# Patient Record
Sex: MALE | Race: WHITE | NOT HISPANIC OR LATINO | Employment: OTHER | URBAN - NONMETROPOLITAN AREA
[De-identification: names, ages, dates, MRNs, and addresses within clinical notes are randomized per-mention and may not be internally consistent; named-entity substitution may affect disease eponyms.]

---

## 2019-11-22 ENCOUNTER — OFFICE VISIT CONVERTED (OUTPATIENT)
Dept: FAMILY MEDICINE CLINIC | Age: 24
End: 2019-11-22
Attending: FAMILY MEDICINE

## 2021-05-18 NOTE — PROGRESS NOTES
Trent Kellogg  1995     Office/Outpatient Visit    Visit Date: Fri, Nov 22, 2019 09:50 am    Provider: Tomas Quinn MD (Assistant: Marie Be MA)    Location: [facility]        Electronically signed by Tomas Quinn MD on  12/29/2019 04:04:10 PM                             Subjective:        CC: Art is a 24 year old White male.  This is his first visit to the clinic.  establish care FATHER STATES PT ISNT TAKING SINGULAIR ANYMORE         HPI: 123/85 with a HR of 100.           PHQ-9 Depression Screening: Completed form scanned and in chart; Total Score 0       Pt has autism. He is accompanied by his father. Father is seeking guardianship. He has lived with his father since the age of 14. Prior to then he lived with both parents from birth until the age of 12, then with his mother from 12 to 14, and then on with his father. From the age of 20 until now he has not had a guardian but has been cared for by his father. His father reports he can write, probably can read, and evaluations at school indicated he has approximately a 6 year old mentality. He is mostly non-verbal but can repeat a couple words and can ask for help or ask for a few things like chips. He cannot carry on a conversation. His father bathes him. He can feed himself and dress himself but his father does all the cooking and cleaning.       As above, pt has severe intellectual disability. According to patient's father, testing showed patient has the intellectual level of a 6 year old.       Pt has autism and severe intellectual disability. He has a strict routine and can become aggressive if this routine is altered. He is on risperdal 1 mg/ml, 1 ml BID for this.     ROS:     CONSTITUTIONAL:  Positive for ROS is limited by pt's autism, intellectual disability, and paucity of language.   Negative for fatigue or fever.      EYES:  Negative for blurred vision.      E/N/T:  Negative for diminished hearing and nasal  congestion.      CARDIOVASCULAR:  Negative for chest pain and palpitations.      RESPIRATORY:  Negative for recent cough and dyspnea.      GASTROINTESTINAL:  Negative for abdominal pain, constipation, diarrhea, nausea and vomiting.      GENITOURINARY:  Negative for dysuria.      MUSCULOSKELETAL:  Negative for arthralgias and myalgias.      INTEGUMENTARY:  Negative for atypical mole(s) and rash.      NEUROLOGICAL:  Positive for intellectual disability, autism.   Negative for paresthesias or weakness.      PSYCHIATRIC:  Positive for pt has andreia rigid routine and can become aggressive if this is altered.   Negative for anxiety, depression or sleep disturbance.          Past Medical History / Family History / Social History:         Last Reviewed on 12/29/2019 04:02 PM by Tomas Quinn    Past Medical History:             PAST MEDICAL HISTORY         Autistic     Autism    Mental Retardation     aggressive behavior         Surgical History:         had to have teeth cleaned under anesthesia 7-13;         Family History:     Father: Hypertension     Mother: hearing impaired         Social History:         Household:  Lives with his father.  barbara lopez carlie     No exposure to tobacco smoke.      goes to [employer]     Occupation:    Disabled     Marital Status: Single     Children: None     Art denies any current form of exercise.          Tobacco/Alcohol/Supplements:     Last Reviewed on 12/29/2019 04:02 PM by Tomas Quinn    Tobacco: He has never smoked.          Substance Abuse History:     Last Reviewed on 12/29/2019 04:02 PM by Tomas Quinn    None         Mental Health History:     Last Reviewed on 12/29/2019 04:02 PM by Tomas Quinn        Autism and intellectual disabilty         Communicable Diseases (eg STDs):     Last Reviewed on 12/29/2019 04:02 PM by Tomas Quinn        Current Problems:     Last Reviewed on 12/29/2019 04:02 PM by Tomas Quinn    Severe intellectual  disabilities    Autistic disorder    Hostility    Encounter for screening for depression        Immunizations:     DTaP 1997    DTaP 2/19/1998    DTaP 4/14/1998    DTaP 6/28/1999    DTaP 11/2/2001    PedvaxHIB (Hib PRP-OMP) 2/19/1998    PedvaxHIB (Hib PRP-OMP) 11/4/1999    ActHIB (Hib PRP-T) 1997    Comvax (Hib-HepB) 1/28/1999    Hep B (pedi/adol, 3-dose schedule) 1997    Hep B (pedi/adol, 3-dose schedule) 1997    Hep B (pedi/adol, 3-dose schedule) 1/28/1999    IPV  Poliovirus, inactivated 1997    IPV  Poliovirus, inactivated 2/19/1998    IPV  Poliovirus, inactivated 4/14/1998    IPV  Poliovirus, inactivated 1/2/2001    MMR  (Measles-Mumps-Rubella), live 1/20/1999    MMR  (Measles-Mumps-Rubella), live 11/2/2001    Varicella, live 1/28/1999        Allergies:     Last Reviewed on 12/29/2019 04:02 PM by Tomas Quinn    No Known Allergies.        Current Medications:     Last Reviewed on 12/29/2019 04:02 PM by Tomas Quinn    Singulair  5mg Chewable Tablet [Chew 1 tablet(s) by mouth each evening]    MVI  qd    Risperdal 1mg/1ml Oral Solution [1 cc bid for behavior]        Objective:        Vitals:         Current: 11/22/2019 9:58:56 AM    Ht:  5 ft, 9.25 in;  Wt: 268 lbs;  BMI: 39.3T: 97.5 F (oral);  BP: 123/85 mm Hg (left arm, sitting);  P: 100 bpm (left arm (BP Cuff), sitting);  sCr: 0.5 mg/dL;  GFR: 261.38        Exams:     PHYSICAL EXAM:     GENERAL: Vitals recorded well developed, well nourished,  moderately obese;  well groomed;  no apparent distress;     EYES: extraocular movements intact; conjunctiva and cornea are normal; PERRLA;     E/N/T: OROPHARYNX:  normal mucosa, dentition, gingiva, and posterior pharynx;     NECK: range of motion is normal; thyroid exam reveals not enlarged;     RESPIRATORY: normal respiratory rate and pattern with no distress; normal breath sounds with no rales, rhonchi, wheezes or rubs;     CARDIOVASCULAR: normal rate; rhythm is regular;  no  systolic murmur; no edema;     GASTROINTESTINAL: nontender; normal bowel sounds;     LYMPHATIC: no enlargement of cervical or facial nodes;     SKIN: lesions on forearms bilaterally;     MUSCULOSKELETAL: normal gait; normal overall tone     NEUROLOGIC: mental status: alert and oriented x 3; reflexes: knee jerks: 2+;     PSYCHIATRIC: affect/demeanor: flat;  psychomotor: displays psychomotor retardation and avoids eye contact;  speech pattern: paucity of language;  thought/perception: thought/perceptin is difficult to ascertain as pt is predominantly non-verbal;         Assessment:         Z13.31   Encounter for screening for depression       F84.0   Autistic disorder       F72   Severe intellectual disabilities       R45.5   Hostility           ORDERS:         Other Orders:         Depression screen negative  (In-House)                      Plan:         Encounter for screening for depression    MIPS PHQ-9 Depression Screening: Completed form scanned and in chart; Total Score 0; Negative Depression Screen           Orders:         Depression screen negative  (In-House)              Autistic disorderPaperwork is completed to help pt's father gain guardianship. Father appears to have a very good and positive relationship with patient and is his sole caregiver. Guardianship and conservatorship is necessary based on patient condition of severe autism and intellectual disability.         Severe intellectual disabilitiesAs above.         HostilityCont risperdal 1 mg/ml, 1 ml BID for agitation related to autism             Charge Capture:         Primary Diagnosis:     Z13.31  Encounter for screening for depression           Orders:      [id number]  Office visit - new pt, level 3  (In-House)              Depression screen negative  (In-House)              F84.0  Autistic disorder     F72  Severe intellectual disabilities     R45.5  Hostility

## 2021-07-01 VITALS
BODY MASS INDEX: 39.69 KG/M2 | WEIGHT: 268 LBS | HEIGHT: 69 IN | TEMPERATURE: 97.5 F | HEART RATE: 100 BPM | DIASTOLIC BLOOD PRESSURE: 85 MMHG | SYSTOLIC BLOOD PRESSURE: 123 MMHG

## 2022-09-27 ENCOUNTER — OFFICE VISIT (OUTPATIENT)
Dept: FAMILY MEDICINE CLINIC | Age: 27
End: 2022-09-27

## 2022-09-27 VITALS
SYSTOLIC BLOOD PRESSURE: 117 MMHG | HEART RATE: 106 BPM | BODY MASS INDEX: 35.28 KG/M2 | OXYGEN SATURATION: 97 % | HEIGHT: 71 IN | WEIGHT: 252 LBS | DIASTOLIC BLOOD PRESSURE: 83 MMHG

## 2022-09-27 DIAGNOSIS — F84.0 AUTISM: Primary | ICD-10-CM

## 2022-09-27 DIAGNOSIS — F79 INTELLECTUAL DISABILITY: ICD-10-CM

## 2022-09-27 DIAGNOSIS — E66.9 CLASS 2 OBESITY WITHOUT SERIOUS COMORBIDITY WITH BODY MASS INDEX (BMI) OF 35.0 TO 35.9 IN ADULT, UNSPECIFIED OBESITY TYPE: ICD-10-CM

## 2022-09-27 PROCEDURE — 99203 OFFICE O/P NEW LOW 30 MIN: CPT | Performed by: NURSE PRACTITIONER

## 2022-09-27 RX ORDER — RISPERIDONE 1 MG/ML
SOLUTION ORAL
Qty: 60 ML | Refills: 5 | Status: SHIPPED | OUTPATIENT
Start: 2022-09-27 | End: 2023-03-28 | Stop reason: SDUPTHER

## 2022-09-27 RX ORDER — RISPERIDONE 1 MG/ML
SOLUTION ORAL
COMMUNITY
Start: 2022-09-06 | End: 2022-09-27 | Stop reason: SDUPTHER

## 2022-09-27 RX ORDER — METFORMIN HYDROCHLORIDE 500 MG/5ML
SOLUTION ORAL
COMMUNITY
Start: 2022-09-24 | End: 2023-01-12 | Stop reason: SDUPTHER

## 2022-09-27 RX ORDER — PEDIATRIC MULTIVITAMIN NO.17
1 TABLET,CHEWABLE ORAL DAILY
COMMUNITY

## 2022-09-27 NOTE — ASSESSMENT & PLAN NOTE
Can only take liquid medication.  Does not need metformin refills at this time.  Will refill metformin when needed.  Dad will continue collaborative discussion and determination of when and if healthcare labs could be done or indicated.  He is advised to monitor for any signs or symptoms of diabetes.

## 2022-09-27 NOTE — ASSESSMENT & PLAN NOTE
Stable on current treatment long-term.  Dad is aware of risks with long-term Risperdal usage.  He feels as if the benefits far outweigh the potential risks or side effects.  I do not routinely prescribe risperidone.  If patient is not stable on current risperidone dosage at any time, I will then recommend referral to a specialist for further evaluation.  Father verbalizes understanding.  The goal right now is to maintain him at his current status.

## 2023-01-12 DIAGNOSIS — E66.9 CLASS 2 OBESITY WITHOUT SERIOUS COMORBIDITY WITH BODY MASS INDEX (BMI) OF 35.0 TO 35.9 IN ADULT, UNSPECIFIED OBESITY TYPE: Primary | ICD-10-CM

## 2023-01-12 RX ORDER — METFORMIN HYDROCHLORIDE 500 MG/5ML
SOLUTION ORAL
Qty: 600 ML | Refills: 3 | Status: SHIPPED | OUTPATIENT
Start: 2023-01-12 | End: 2023-03-28 | Stop reason: SDUPTHER

## 2023-01-16 ENCOUNTER — PRIOR AUTHORIZATION (OUTPATIENT)
Dept: FAMILY MEDICINE CLINIC | Age: 28
End: 2023-01-16
Payer: MEDICAID

## 2023-03-26 DIAGNOSIS — F84.0 AUTISM: ICD-10-CM

## 2023-03-26 DIAGNOSIS — F79 INTELLECTUAL DISABILITY: ICD-10-CM

## 2023-03-28 ENCOUNTER — OFFICE VISIT (OUTPATIENT)
Dept: FAMILY MEDICINE CLINIC | Age: 28
End: 2023-03-28
Payer: MEDICAID

## 2023-03-28 VITALS
HEIGHT: 71 IN | SYSTOLIC BLOOD PRESSURE: 119 MMHG | DIASTOLIC BLOOD PRESSURE: 77 MMHG | BODY MASS INDEX: 33.74 KG/M2 | OXYGEN SATURATION: 93 % | WEIGHT: 241 LBS | HEART RATE: 84 BPM

## 2023-03-28 DIAGNOSIS — E66.9 CLASS 1 OBESITY WITHOUT SERIOUS COMORBIDITY WITH BODY MASS INDEX (BMI) OF 34.0 TO 34.9 IN ADULT, UNSPECIFIED OBESITY TYPE: ICD-10-CM

## 2023-03-28 DIAGNOSIS — F79 INTELLECTUAL DISABILITY: Primary | ICD-10-CM

## 2023-03-28 DIAGNOSIS — F84.0 AUTISM: ICD-10-CM

## 2023-03-28 PROCEDURE — 99213 OFFICE O/P EST LOW 20 MIN: CPT | Performed by: NURSE PRACTITIONER

## 2023-03-28 PROCEDURE — 1159F MED LIST DOCD IN RCRD: CPT | Performed by: NURSE PRACTITIONER

## 2023-03-28 PROCEDURE — 1160F RVW MEDS BY RX/DR IN RCRD: CPT | Performed by: NURSE PRACTITIONER

## 2023-03-28 RX ORDER — RISPERIDONE 1 MG/ML
SOLUTION ORAL
Qty: 180 ML | Refills: 1 | Status: SHIPPED | OUTPATIENT
Start: 2023-03-28

## 2023-03-28 RX ORDER — RISPERIDONE 1 MG/ML
SOLUTION ORAL
Qty: 60 ML | Refills: 5 | Status: SHIPPED | OUTPATIENT
Start: 2023-03-28 | End: 2023-03-28

## 2023-03-28 RX ORDER — METFORMIN HYDROCHLORIDE 500 MG/5ML
SOLUTION ORAL
Qty: 600 ML | Refills: 3 | Status: SHIPPED | OUTPATIENT
Start: 2023-03-28 | End: 2023-03-28

## 2023-03-28 RX ORDER — RISPERIDONE 1 MG/ML
SOLUTION ORAL
Qty: 60 ML | Refills: 5 | OUTPATIENT
Start: 2023-03-28

## 2023-03-28 RX ORDER — METFORMIN HYDROCHLORIDE 500 MG/5ML
SOLUTION ORAL
Qty: 600 ML | Refills: 3 | Status: SHIPPED | OUTPATIENT
Start: 2023-03-28

## 2023-03-28 NOTE — ASSESSMENT & PLAN NOTE
Consider possible lab collection with sedation in the future.  Refills provided.  We will continue current treatment.  Will refer for further evaluation if not stable on current treatment.  Sleep is normal for patient.

## 2023-03-28 NOTE — PROGRESS NOTES
"Chief Complaint  Autism (6 month follow up ), Intellectual disability, and Obesity    Subjective          Trent Kellogg presents to [facility]     Patient is a 27-year-old male who is here today with his father to follow-up regarding obesity and autism.  He has been taking metformin solution from his former pediatrician for prevention of diabetes due to his obesity.  Patient is a picky eater.  He has experienced some intentional weight loss with eating out less often.  Appetite is normal for patient.  Has not received any vaccinations since he was 7 years old and has to have sedation when getting his teeth cleaned twice a year at [facility].  Prefers not to have labs done.    Continues to do well on risperdal.  Previous history of biting.  Does not feel comfortable with taking patient to [facility] due to risk with his behavior.  Overall is done very well on current treatment and would like to continue current treatment.  Denies medication side effects.  Is aware of risk versus benefits with continued use of risperdal.     Objective   Vital Signs:   Vitals:    03/28/23 1503   BP: 119/77   BP Location: Left arm   Patient Position: Sitting   Cuff Size: Large Adult   Pulse: 84   SpO2: 93%   Weight: 109 kg (241 lb)   Height: 179.1 cm (70.5\")      Body mass index is 34.09 kg/m².  Physical Exam  Vitals reviewed.   Constitutional:       General: He is not in acute distress.     Appearance: Normal appearance. He is well-developed. He is obese.   Neck:      Thyroid: No thyromegaly.   Cardiovascular:      Rate and Rhythm: Normal rate and regular rhythm.      Heart sounds: Normal heart sounds.   Pulmonary:      Effort: Pulmonary effort is normal.      Breath sounds: Normal breath sounds.   Musculoskeletal:      Right lower leg: No edema.      Left lower leg: No edema.   Skin:     General: Skin is warm and dry.   Neurological:      General: No focal deficit present.      " Mental Status: He is alert.   Psychiatric:         Attention and Perception: Attention normal.         Mood and Affect: Mood and affect normal.         Behavior: Behavior normal.           Current Outpatient Medications:   •  metFORMIN HCl 500 MG/5ML solution, Take 2 tsp by mouth twice daily, Disp: 600 mL, Rfl: 3  •  Pediatric Multiple Vitamins (Multivitamin Childrens) chewable tablet, Chew 1 each Daily., Disp: , Rfl:   •  risperiDONE (risperDAL) 1 MG/ML oral solution, 1 ml twice daily, Disp: 180 mL, Rfl: 1  •  TRIAZOLAM PO, TAKE 2.5ML BY MOUTH AT BEDTIME THE NIGHT BEFORE DENTAL APPOINTMENT AND THEN 2.5ML BY MOUTH ONE HOUR PRIOR TO DENTAL APPOINTMENT, Disp: , Rfl:    History reviewed. No pertinent past medical history.      Result Review :                                    Assessment and Plan    Diagnoses and all orders for this visit:    1. Intellectual disability (Primary)  Assessment & Plan:  Consider possible lab collection with sedation in the future.  Refills provided.  We will continue current treatment.  Will refer for further evaluation if not stable on current treatment.  Sleep is normal for patient.    Orders:  -     Discontinue: risperiDONE (risperDAL) 1 MG/ML oral solution; 1 ml twice daily  Dispense: 60 mL; Refill: 5  -     risperiDONE (risperDAL) 1 MG/ML oral solution; 1 ml twice daily  Dispense: 180 mL; Refill: 1    2. Autism  -     Discontinue: risperiDONE (risperDAL) 1 MG/ML oral solution; 1 ml twice daily  Dispense: 60 mL; Refill: 5  -     risperiDONE (risperDAL) 1 MG/ML oral solution; 1 ml twice daily  Dispense: 180 mL; Refill: 1    3. Class 1 obesity without serious comorbidity with body mass index (BMI) of 34.0 to 34.9 in adult, unspecified obesity type  -     metFORMIN HCl 500 MG/5ML solution; Take 2 tsp by mouth twice daily  Dispense: 600 mL; Refill: 3    Other orders  -     Discontinue: metFORMIN HCl 500 MG/5ML solution; Take 2 tsp by mouth twice daily  Dispense: 600 mL; Refill:  3      Follow Up    No follow-ups on file.  Patient was given instructions and counseling regarding his condition or for health maintenance advice. Please see specific information pulled into the AVS if appropriate.

## 2023-07-26 DIAGNOSIS — F79 INTELLECTUAL DISABILITY: ICD-10-CM

## 2023-07-26 DIAGNOSIS — F84.0 AUTISM: ICD-10-CM

## 2023-07-26 RX ORDER — RISPERIDONE 1 MG/ML
SOLUTION ORAL
Qty: 180 ML | Refills: 1 | OUTPATIENT
Start: 2023-07-26

## 2023-09-04 DIAGNOSIS — E66.9 CLASS 1 OBESITY WITHOUT SERIOUS COMORBIDITY WITH BODY MASS INDEX (BMI) OF 34.0 TO 34.9 IN ADULT, UNSPECIFIED OBESITY TYPE: ICD-10-CM

## 2023-09-05 RX ORDER — METFORMIN HYDROCHLORIDE 500 MG/5ML
SOLUTION ORAL
Qty: 600 ML | Refills: 3 | OUTPATIENT
Start: 2023-09-05

## 2023-09-28 ENCOUNTER — OFFICE VISIT (OUTPATIENT)
Dept: FAMILY MEDICINE CLINIC | Age: 28
End: 2023-09-28
Payer: MEDICAID

## 2023-09-28 VITALS
OXYGEN SATURATION: 98 % | SYSTOLIC BLOOD PRESSURE: 124 MMHG | HEIGHT: 71 IN | HEART RATE: 103 BPM | BODY MASS INDEX: 31.33 KG/M2 | WEIGHT: 223.8 LBS | DIASTOLIC BLOOD PRESSURE: 76 MMHG

## 2023-09-28 DIAGNOSIS — F84.0 AUTISM: Primary | ICD-10-CM

## 2023-09-28 DIAGNOSIS — F79 INTELLECTUAL DISABILITY: ICD-10-CM

## 2023-09-28 DIAGNOSIS — E66.9 CLASS 1 OBESITY WITH SERIOUS COMORBIDITY AND BODY MASS INDEX (BMI) OF 31.0 TO 31.9 IN ADULT, UNSPECIFIED OBESITY TYPE: ICD-10-CM

## 2023-09-28 PROCEDURE — 1159F MED LIST DOCD IN RCRD: CPT | Performed by: NURSE PRACTITIONER

## 2023-09-28 PROCEDURE — 1160F RVW MEDS BY RX/DR IN RCRD: CPT | Performed by: NURSE PRACTITIONER

## 2023-09-28 PROCEDURE — 99213 OFFICE O/P EST LOW 20 MIN: CPT | Performed by: NURSE PRACTITIONER

## 2023-09-28 RX ORDER — METFORMIN HYDROCHLORIDE 500 MG/5ML
SOLUTION ORAL
Qty: 600 ML | Refills: 3 | Status: SHIPPED | OUTPATIENT
Start: 2023-09-28

## 2023-09-28 RX ORDER — RISPERIDONE 1 MG/ML
SOLUTION ORAL
Qty: 180 ML | Refills: 1 | Status: SHIPPED | OUTPATIENT
Start: 2023-09-28

## 2023-09-28 NOTE — PROGRESS NOTES
Chief Complaint  Intellectual disability (6 month follow up ), Autistic Spectrum, and Obesity    Subjective          Trent Kellogg presents to [facility]      Patient is a 27-year-old male who is here today with his father to follow-up regarding obesity and autism.  He has been taking metformin solution from his former pediatrician for prevention of diabetes due to his obesity.  Patient is a picky eater.  He has experienced some intentional weight loss with eating out less often.  Appetite is normal for patient.  Has not received any vaccinations since he was 7 years old and has to have sedation when getting his teeth cleaned twice a year at [facility] [employer].  Prefers not to have labs done.     Continues to do well on risperdal.  Previous history of biting.  Does not feel comfortable with taking patient to [facility] due to risk with his behavior.  Overall is done very well on current treatment and would like to continue current treatment.  Denies medication side effects.  Is aware of risk versus benefits with continued use of risperdal.    Patient weight is down approximately 18 pounds since his last visit in March.  Father denies any change in his eating habits or activity level.  He may be eating less sweets.  Denies any increase in thirst or urination.  If patient is having unexpected weight loss it is imperative that we get labs done.  Patient require sedation to go to the dentist for cleanings.  We may need to consider sedating him to have labs done if he is having unexplained weight loss.  Father verbalizes understanding.  I encouraged him to monitor more closely and observe for symptoms of diabetes or other conditions that could contribute to unexplained weight loss.  He will let me know if any symptoms are present and we will try to come up with a plan to get labs collected for further evaluation    Objective   Vital Signs:   Vitals:    09/28/23 1505   BP:  "124/76   BP Location: Right arm   Patient Position: Sitting   Cuff Size: Adult   Pulse: 103   SpO2: 98%   Weight: 102 kg (223 lb 12.8 oz)   Height: 179.1 cm (70.5\")       Wt Readings from Last 3 Encounters:   09/28/23 102 kg (223 lb 12.8 oz)   03/28/23 109 kg (241 lb)   09/27/22 114 kg (252 lb)      BP Readings from Last 3 Encounters:   09/28/23 124/76   03/28/23 119/77   09/27/22 117/83       Body mass index is 31.66 kg/m².             Physical Exam  Vitals reviewed.   Constitutional:       General: He is not in acute distress.     Appearance: Normal appearance. He is well-developed. He is obese.   Neck:      Thyroid: No thyromegaly.   Cardiovascular:      Rate and Rhythm: Normal rate and regular rhythm.      Pulses:           Posterior tibial pulses are 2+ on the right side and 2+ on the left side.      Heart sounds: Normal heart sounds.   Pulmonary:      Effort: Pulmonary effort is normal.      Breath sounds: Normal breath sounds.   Musculoskeletal:      Right lower leg: No edema.      Left lower leg: No edema.   Skin:     General: Skin is warm and dry.   Neurological:      General: No focal deficit present.      Mental Status: He is alert.   Psychiatric:         Attention and Perception: Attention normal.         Mood and Affect: Mood and affect normal.         Behavior: Behavior normal.         Current Outpatient Medications:     CBD (cannabidiol) oral oil, Take  by mouth. 1 Gummie before bed, Disp: , Rfl:     metFORMIN HCl 500 MG/5ML solution, Take 2 tsp by mouth twice daily, Disp: 600 mL, Rfl: 3    Pediatric Multiple Vitamins (Multivitamin Childrens) chewable tablet, Chew 1 each Daily., Disp: , Rfl:     risperiDONE (risperDAL) 1 MG/ML oral solution, 1 ml twice daily, Disp: 180 mL, Rfl: 1    TRIAZOLAM PO, TAKE 2.5ML BY MOUTH AT BEDTIME THE NIGHT BEFORE DENTAL APPOINTMENT AND THEN 2.5ML BY MOUTH ONE HOUR PRIOR TO DENTAL APPOINTMENT (Patient not taking: Reported on 9/28/2023), Disp: , Rfl:    Past Medical " History:   Diagnosis Date    ADHD (attention deficit hyperactivity disorder)      No Known Allergies            Result Review :          No Images in the past 120 days found..           Social History     Tobacco Use   Smoking Status Never   Smokeless Tobacco Never           Assessment and Plan    Diagnoses and all orders for this visit:    1. Autism (Primary)  Assessment & Plan:  Father reports that status is stable.  Continue current treatment.    Orders:  -     risperiDONE (risperDAL) 1 MG/ML oral solution; 1 ml twice daily  Dispense: 180 mL; Refill: 1    2. Intellectual disability  -     risperiDONE (risperDAL) 1 MG/ML oral solution; 1 ml twice daily  Dispense: 180 mL; Refill: 1    3. Class 1 obesity with serious comorbidity and body mass index (BMI) of 31.0 to 31.9 in adult, unspecified obesity type  -     metFORMIN HCl 500 MG/5ML solution; Take 2 tsp by mouth twice daily  Dispense: 600 mL; Refill: 3        Follow Up    Return in about 6 months (around 3/28/2024).  Patient was given instructions and counseling regarding his condition or for health maintenance advice. Please see specific information pulled into the AVS if appropriate.

## 2024-01-09 ENCOUNTER — PRIOR AUTHORIZATION (OUTPATIENT)
Dept: FAMILY MEDICINE CLINIC | Age: 29
End: 2024-01-09
Payer: MEDICAID

## 2024-03-26 ENCOUNTER — TELEPHONE (OUTPATIENT)
Dept: FAMILY MEDICINE CLINIC | Age: 29
End: 2024-03-26
Payer: MEDICAID

## 2024-03-26 NOTE — TELEPHONE ENCOUNTER
"Caller: LESA MORTON    Relationship: Brother/Sister    Best call back number: [phone]    What form or medical record are you requesting: LETTER STATING DUE TO THE PATIENTS DISABILITY HE REQUIRES A CARETAKER AND IT MUST STATE THE CARETAKERS NAME \"ITZEL HOUX\" WHO IS THE PATIENTS .     Who is requesting this form or medical record from you: [facility] OF [facility] FAMILY    How would you like to receive the form or medical records (pick-up, mail, fax):      Timeframe paperwork needed: ASAP      "

## 2024-04-05 ENCOUNTER — OFFICE VISIT (OUTPATIENT)
Dept: FAMILY MEDICINE CLINIC | Age: 29
End: 2024-04-05
Payer: MEDICAID

## 2024-04-05 VITALS
TEMPERATURE: 98.5 F | HEART RATE: 107 BPM | HEIGHT: 71 IN | OXYGEN SATURATION: 98 % | BODY MASS INDEX: 24.81 KG/M2 | DIASTOLIC BLOOD PRESSURE: 77 MMHG | SYSTOLIC BLOOD PRESSURE: 108 MMHG | WEIGHT: 177.2 LBS

## 2024-04-05 DIAGNOSIS — H10.31 ACUTE CONJUNCTIVITIS OF RIGHT EYE, UNSPECIFIED ACUTE CONJUNCTIVITIS TYPE: Primary | ICD-10-CM

## 2024-04-05 PROCEDURE — 1159F MED LIST DOCD IN RCRD: CPT | Performed by: NURSE PRACTITIONER

## 2024-04-05 PROCEDURE — 99213 OFFICE O/P EST LOW 20 MIN: CPT | Performed by: NURSE PRACTITIONER

## 2024-04-05 PROCEDURE — 1160F RVW MEDS BY RX/DR IN RCRD: CPT | Performed by: NURSE PRACTITIONER

## 2024-04-05 RX ORDER — POLYMYXIN B SULFATE AND TRIMETHOPRIM 1; 10000 MG/ML; [USP'U]/ML
1 SOLUTION OPHTHALMIC EVERY 4 HOURS
Qty: 10 ML | Refills: 0 | Status: SHIPPED | OUTPATIENT
Start: 2024-04-05

## 2024-04-05 NOTE — PROGRESS NOTES
Chief Complaint  Trent Kellogg presents to [facility] for Eye Problem (Right eye itching, watering, redness, X yesterday )    Subjective          History of Present Illness    Art is here today with his caregiver. Reports right eye irritation, watery drainage, erythema since yesterday. He has been keeping his eyes closed. Unable to perform visual acuity due to hx intellectual disability.     Review of Systems      No Known Allergies   Past Medical History:   Diagnosis Date    ADHD (attention deficit hyperactivity disorder)      Current Outpatient Medications   Medication Sig Dispense Refill    metFORMIN HCl 500 MG/5ML solution Take 2 tsp by mouth twice daily 600 mL 3    Pediatric Multiple Vitamins (Multivitamin Childrens) chewable tablet Chew 1 each Daily.      risperiDONE (risperDAL) 1 MG/ML oral solution 1 ml twice daily 180 mL 1    TRIAZOLAM PO As Needed (before dental appts).      trimethoprim-polymyxin b (Polytrim) 88516-0.1 UNIT/ML-% ophthalmic solution Administer 1 drop to the right eye Every 4 (Four) Hours. 10 mL 0     No current facility-administered medications for this visit.     History reviewed. No pertinent surgical history.   Social History     Tobacco Use    Smoking status: Never     Passive exposure: Past    Smokeless tobacco: Never   Vaping Use    Vaping status: Never Used   Substance Use Topics    Alcohol use: Never    Drug use: Never     Family History   Problem Relation Age of Onset    Peripheral vascular disease Paternal Grandmother     Heart disease Paternal Grandfather     Alcohol abuse Father     Hyperlipidemia Father     Liver disease Father     Heart disease Paternal Uncle      Health Maintenance Due   Topic Date Due    TDAP/TD VACCINES (2 - Td or Tdap) 07/11/2022    ANNUAL PHYSICAL  Never done      Immunization History   Administered Date(s) Administered    DTaP, Unspecified 1997, 02/19/1998    Hep B, Adolescent or Pediatric 1997    HiB  "1997, 02/19/1998    IPV 1997, 02/19/1998    Meningococcal MCV4P (Menactra) 07/11/2012    Tdap 07/11/2012    Varicella 07/11/2012        Objective     Vitals:    04/05/24 0942   BP: 108/77   BP Location: Right arm   Patient Position: Sitting   Cuff Size: Adult   Pulse: 107   Temp: 98.5 °F (36.9 °C)   TempSrc: Infrared   SpO2: 98%   Weight: 80.4 kg (177 lb 3.2 oz)   Height: 179.1 cm (70.5\")     Body mass index is 25.07 kg/m².                No results found.    Physical Exam  Vitals reviewed.   Constitutional:       General: He is not in acute distress.     Appearance: Normal appearance. He is well-developed.   HENT:      Head: Normocephalic and atraumatic.   Eyes:      General: Lids are normal.         Right eye: Discharge (watery) present.         Left eye: No discharge.      Conjunctiva/sclera:      Right eye: Right conjunctiva is injected.      Left eye: Left conjunctiva is not injected.      Comments: Bilateral reactive to light   Neurological:      Mental Status: He is alert. Mental status is at baseline.   Psychiatric:         Mood and Affect: Affect normal.           Result Review :                               Assessment and Plan      Assessment & Plan  Acute conjunctivitis of right eye, unspecified acute conjunctivitis type  Will treat with polytrim drops. Advised to wash hands before and after administering drops. Also instructed on use of compresses. Use clean washcloth. May need to see optometry if persistent symptoms.      New Medications Ordered This Visit   Medications    trimethoprim-polymyxin b (Polytrim) 61380-3.1 UNIT/ML-% ophthalmic solution     Sig: Administer 1 drop to the right eye Every 4 (Four) Hours.     Dispense:  10 mL     Refill:  0                 Follow Up     Return for As needed for persistent or worsening symptoms, Chronic visit follow up, with PCP.             "

## 2024-04-09 ENCOUNTER — TELEPHONE (OUTPATIENT)
Dept: FAMILY MEDICINE CLINIC | Age: 29
End: 2024-04-09
Payer: MEDICAID

## 2024-04-09 NOTE — TELEPHONE ENCOUNTER
Pt was informed on no show policy as well as a letter was sent for first no show on 4/21/24 at 3:00.

## 2024-04-12 ENCOUNTER — TELEPHONE (OUTPATIENT)
Dept: FAMILY MEDICINE CLINIC | Age: 29
End: 2024-04-12
Payer: MEDICAID

## 2024-04-12 NOTE — TELEPHONE ENCOUNTER
Caller: LESA MORTON    Relationship: Brother/Sister    Best call back number: [phone]    What was the call regarding: LESA STATES SHE WOULD LIKE TO KNOW IF THE PATIENTS SURENDRA MCKEON WOULD BE READY TODAY (4/12) BECAUSE SHE REALLY HOPED TO HAVE IT BACK THIS WEEK.

## 2024-05-09 ENCOUNTER — TELEPHONE (OUTPATIENT)
Dept: FAMILY MEDICINE CLINIC | Age: 29
End: 2024-05-09
Payer: MEDICAID

## 2024-06-05 DIAGNOSIS — F84.0 AUTISM: ICD-10-CM

## 2024-06-05 DIAGNOSIS — F79 INTELLECTUAL DISABILITY: ICD-10-CM

## 2024-06-05 RX ORDER — RISPERIDONE 1 MG/ML
SOLUTION ORAL
Qty: 180 ML | Refills: 0 | Status: SHIPPED | OUTPATIENT
Start: 2024-06-05